# Patient Record
Sex: MALE | Race: WHITE | Employment: FULL TIME | URBAN - NONMETROPOLITAN AREA
[De-identification: names, ages, dates, MRNs, and addresses within clinical notes are randomized per-mention and may not be internally consistent; named-entity substitution may affect disease eponyms.]

---

## 2021-08-25 ENCOUNTER — APPOINTMENT (OUTPATIENT)
Dept: GENERAL RADIOLOGY | Facility: HOSPITAL | Age: 26
End: 2021-08-25
Attending: NURSE PRACTITIONER
Payer: OTHER MISCELLANEOUS

## 2021-08-25 ENCOUNTER — HOSPITAL ENCOUNTER (EMERGENCY)
Facility: HOSPITAL | Age: 26
Discharge: HOME | End: 2021-08-25
Attending: NURSE PRACTITIONER | Admitting: NURSE PRACTITIONER
Payer: OTHER MISCELLANEOUS

## 2021-08-25 VITALS
TEMPERATURE: 97.6 F | SYSTOLIC BLOOD PRESSURE: 156 MMHG | DIASTOLIC BLOOD PRESSURE: 90 MMHG | RESPIRATION RATE: 16 BRPM | HEART RATE: 54 BPM | OXYGEN SATURATION: 98 %

## 2021-08-25 DIAGNOSIS — T14.8XXA SPLINTER IN SKIN: Primary | ICD-10-CM

## 2021-08-25 PROCEDURE — 99213 OFFICE O/P EST LOW 20 MIN: CPT | Performed by: NURSE PRACTITIONER

## 2021-08-25 PROCEDURE — G0463 HOSPITAL OUTPT CLINIC VISIT: HCPCS

## 2021-08-25 PROCEDURE — 73140 X-RAY EXAM OF FINGER(S): CPT | Mod: RT

## 2021-08-25 ASSESSMENT — ENCOUNTER SYMPTOMS
CHILLS: 0
WOUND: 1
NAUSEA: 0
SHORTNESS OF BREATH: 0
VOMITING: 0
DIARRHEA: 0
FEVER: 0
PSYCHIATRIC NEGATIVE: 1

## 2021-08-25 NOTE — DISCHARGE INSTRUCTIONS
Rest, Ice and Elevate hand    Alternate tylenol and ibuprofen for discomfort as needed    Call and schedule a follow up appointment in 1 week with orthopedic associates at [phone] if still feeling splinter sensation, pain and decreased movement to finger.    Return to urgent care/ED as needed with any worsening in condition or additional concerns.

## 2021-08-25 NOTE — ED PROVIDER NOTES
History     Chief Complaint   Patient presents with     Foreign Body in Skin     HPI  Ronald Juarez is a 25 year old male who presents to urgent care today with complaints of a splinter to ring finger.  Patient states occurred shortly before he arrived as he was at work for [employer] and was putting up treated poles and wants to make sure he gets the splinter out.  Pain 3/10, tolerable.  Decreased ROM to fourth digit.  Denies any fever, chills, nausea, vomiting, diarrhea, SOB or chest pain.  UTD on Tdap last administered 11/27/2020.  No other concerns.     Allergies:  Allergies   Allergen Reactions     Contrast Dye Dizziness       Problem List:    There are no problems to display for this patient.       Past Medical History:    No past medical history on file.    Past Surgical History:    No past surgical history on file.    Family History:    No family history on file.    Social History:  Marital Status:   [2]  Social History     Tobacco Use     Smoking status: Not on file   Substance Use Topics     Alcohol use: Not on file     Drug use: Not on file        Medications:    No current outpatient medications on file.    Review of Systems   Constitutional: Negative for chills and fever.   Respiratory: Negative for shortness of breath.    Cardiovascular: Negative for chest pain.   Gastrointestinal: Negative for diarrhea, nausea and vomiting.   Musculoskeletal: Negative for gait problem.   Skin: Positive for wound (right hand ring finger).   Psychiatric/Behavioral: Negative.      Physical Exam   BP: 156/90  Pulse: 54  Temp: 97.6  F (36.4  C)  Resp: 16  SpO2: 98 %    Physical Exam  Vitals and nursing note reviewed.   Constitutional:       General: He is not in acute distress.     Appearance: He is not ill-appearing.   Cardiovascular:      Rate and Rhythm: Regular rhythm. Bradycardia present.      Pulses: Normal pulses.      Heart sounds: Normal heart sounds.   Pulmonary:      Effort: Pulmonary effort is normal.       Breath sounds: Normal breath sounds.   Musculoskeletal:      Right hand: Tenderness present. No swelling. Decreased range of motion (4th digit). Normal strength. Normal sensation. There is no disruption of two-point discrimination. Normal capillary refill. Normal pulse.      Comments: Small open wound to right ring finger from previous embedded splinter.  No obvious splinter noted in finger at this time.   Skin:     General: Skin is warm and dry.      Capillary Refill: Capillary refill takes less than 2 seconds.   Neurological:      Mental Status: He is alert.   Psychiatric:         Mood and Affect: Mood normal.       ED Course     Results for orders placed or performed during the hospital encounter of 08/25/21 (from the past 24 hour(s))   XR Finger Right G/E 2 Views    Narrative    PROCEDURE:  XR FINGER RT G/E 2 VW    HISTORY: possible foreign body    COMPARISON:  None.    TECHNIQUE:  2 views of the right fourth finger were obtained.    FINDINGS:  No fracture or dislocation is identified. The joint spaces  are preserved.        Impression    IMPRESSION: Normal right fourth finger      CALLI HICKS MD         SYSTEM ID:  [id number]       Medications - No data to display    Assessments & Plan (with Medical Decision Making)     I have reviewed the nursing notes.    I have reviewed the findings, diagnosis, plan and need for follow up with the patient.  (T14.8XXA) Splinter in skin  (primary encounter diagnosis)  Plan:   Small open wound to right ring finger from previous embedded splinter.  No obvious splinter noted in finger at this time.  No actively bleeding.  XR completed and impression shows normal right fourth finger.  Hand soaked in warm soapy water, flushed with 40cc NS.  Triple antibiotic and dressing placed.  Patient to rest finger, ice and elevate.  Ibuprofen and tylenol as needed for discomfort.  Patient to follow up with orthopedic associates in 1 week if symptoms not improving or worsening.  Patient  to return to urgent care/ED with any worsening in condition or additional concerns.  Patient in agreement with treatment plan.     New Prescriptions    No medications on file     Final diagnoses:   Splinter in skin     8/25/2021   [facility]     Urszula Gomez NP  08/25/21 1203

## 2021-08-25 NOTE — ED TRIAGE NOTES
Works for [employer] and was putting poles up and the wood is treated and got a piece stuck in his finger.  Pt attempted to get it out.  Last tdap 11/27/2020

## 2023-05-24 ENCOUNTER — HOSPITAL ENCOUNTER (EMERGENCY)
Facility: HOSPITAL | Age: 28
Discharge: HOME | End: 2023-05-24
Attending: NURSE PRACTITIONER | Admitting: NURSE PRACTITIONER
Payer: OTHER MISCELLANEOUS

## 2023-05-24 VITALS
WEIGHT: 221.6 LBS | RESPIRATION RATE: 18 BRPM | HEART RATE: 61 BPM | SYSTOLIC BLOOD PRESSURE: 114 MMHG | OXYGEN SATURATION: 99 % | TEMPERATURE: 97 F | DIASTOLIC BLOOD PRESSURE: 81 MMHG

## 2023-05-24 DIAGNOSIS — T23.201A PARTIAL THICKNESS BURN OF MULTIPLE SITES OF RIGHT HAND, INITIAL ENCOUNTER: ICD-10-CM

## 2023-05-24 PROBLEM — F41.9 ANXIETY: Status: ACTIVE | Noted: 2021-04-28

## 2023-05-24 PROCEDURE — 96374 THER/PROPH/DIAG INJ IV PUSH: CPT

## 2023-05-24 PROCEDURE — 16020 DRESS/DEBRID P-THICK BURN S: CPT | Performed by: NURSE PRACTITIONER

## 2023-05-24 PROCEDURE — 96375 TX/PRO/DX INJ NEW DRUG ADDON: CPT

## 2023-05-24 PROCEDURE — 16020 DRESS/DEBRID P-THICK BURN S: CPT

## 2023-05-24 PROCEDURE — 250N000009 HC RX 250: Performed by: NURSE PRACTITIONER

## 2023-05-24 PROCEDURE — 99284 EMERGENCY DEPT VISIT MOD MDM: CPT | Mod: 25

## 2023-05-24 PROCEDURE — 250N000011 HC RX IP 250 OP 636: Performed by: NURSE PRACTITIONER

## 2023-05-24 PROCEDURE — 96376 TX/PRO/DX INJ SAME DRUG ADON: CPT | Mod: XU

## 2023-05-24 PROCEDURE — 250N000013 HC RX MED GY IP 250 OP 250 PS 637: Performed by: NURSE PRACTITIONER

## 2023-05-24 RX ORDER — OXYCODONE HYDROCHLORIDE 5 MG/1
5 TABLET ORAL EVERY 6 HOURS PRN
Qty: 12 TABLET | Refills: 0 | Status: SHIPPED | OUTPATIENT
Start: 2023-05-24 | End: 2023-05-27

## 2023-05-24 RX ORDER — HYDROMORPHONE HYDROCHLORIDE 1 MG/ML
0.5 INJECTION, SOLUTION INTRAMUSCULAR; INTRAVENOUS; SUBCUTANEOUS
Status: DISCONTINUED | OUTPATIENT
Start: 2023-05-24 | End: 2023-05-24 | Stop reason: HOSPADM

## 2023-05-24 RX ORDER — LIDOCAINE HYDROCHLORIDE 20 MG/ML
JELLY TOPICAL ONCE
Status: COMPLETED | OUTPATIENT
Start: 2023-05-24 | End: 2023-05-24

## 2023-05-24 RX ORDER — KETOROLAC TROMETHAMINE 10 MG/1
10 TABLET, FILM COATED ORAL EVERY 6 HOURS PRN
Qty: 20 TABLET | Refills: 0 | Status: SHIPPED | OUTPATIENT
Start: 2023-05-24

## 2023-05-24 RX ORDER — KETOROLAC TROMETHAMINE 30 MG/ML
30 INJECTION, SOLUTION INTRAMUSCULAR; INTRAVENOUS ONCE
Status: COMPLETED | OUTPATIENT
Start: 2023-05-24 | End: 2023-05-24

## 2023-05-24 RX ORDER — OXYCODONE HYDROCHLORIDE 5 MG/1
5 TABLET ORAL ONCE
Status: COMPLETED | OUTPATIENT
Start: 2023-05-24 | End: 2023-05-24

## 2023-05-24 RX ADMIN — HYDROMORPHONE HYDROCHLORIDE 0.5 MG: 1 INJECTION, SOLUTION INTRAMUSCULAR; INTRAVENOUS; SUBCUTANEOUS at 13:18

## 2023-05-24 RX ADMIN — Medication 10 MG: at 15:25

## 2023-05-24 RX ADMIN — OXYCODONE HYDROCHLORIDE 5 MG: 5 TABLET ORAL at 15:26

## 2023-05-24 RX ADMIN — LIDOCAINE HYDROCHLORIDE: 20 JELLY TOPICAL at 13:18

## 2023-05-24 RX ADMIN — Medication 20 MG: at 14:54

## 2023-05-24 RX ADMIN — KETOROLAC TROMETHAMINE 30 MG: 30 INJECTION, SOLUTION INTRAMUSCULAR; INTRAVENOUS at 13:13

## 2023-05-24 RX ADMIN — Medication 10 MG: at 14:25

## 2023-05-24 ASSESSMENT — ENCOUNTER SYMPTOMS
ENDOCRINE NEGATIVE: 1
HEMATOLOGIC/LYMPHATIC NEGATIVE: 1
EYES NEGATIVE: 1
NEUROLOGICAL NEGATIVE: 1
PSYCHIATRIC NEGATIVE: 1
ALLERGIC/IMMUNOLOGIC NEGATIVE: 1
GASTROINTESTINAL NEGATIVE: 1
CARDIOVASCULAR NEGATIVE: 1
RESPIRATORY NEGATIVE: 1
MUSCULOSKELETAL NEGATIVE: 1
CONSTITUTIONAL NEGATIVE: 1

## 2023-05-24 ASSESSMENT — ACTIVITIES OF DAILY LIVING (ADL)
ADLS_ACUITY_SCORE: 35
ADLS_ACUITY_SCORE: 35
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
## 2023-05-24 NOTE — ED NOTES
Verbalizing understanding of discharge. Dressing supplies given ice bags renewed. Dressing intact. CMS intact
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
## 2023-05-24 NOTE — DISCHARGE INSTRUCTIONS
Wound care:   Wash your wound 2 times each day with soap and water.  After this, apply antibiotic ointment and a dressing (such as a Band-Aid) for 2-3 days.  Otherwise, keep your wound clean and dry.  This means no swimming or extended submersion in water until the wound is completely healed.  Remember, all wounds will leave some sort of scar.    Signs of infection:   Watch for signs of infection which include severe pain at site, increasing redness with pain, pus colored drainage, or if you develop a fever.  If this occurs, come back in for re-evaluation and most likely antibiotic therapy.       Narcotic pain medications (Oxycondone):   The medications prescribed can be quite effective for control of your pain but ARE NOT a safe long-term choice for your symptom control.  This medication is highly addictive and can lead to medication abuse.  It is recommended to try the ibuprofen/acetaminophen regimen (if able) described below before using the narcotic.   Do not drive, operate machinery, or do work that could harm people when under the influence of narcotic pain medications.  It can impair perception, reaction time, motor skills, and attention in ways that make it dangerous to drive, operate machinery, or engage in any activity at home or at work that could harm others or cause professional malpractice.  Just how long such impairments will last for a particular individual taking a particular type and dose is unknown, but it is at least several hours.  Drinking alcohol while taking narcotic pain medications makes impairment much worse, so abstain for alcohol use.    I recommend taking a stool softener such as Colace or Senokot-S which can be purchased over the counter while you are taking narcotics since these medications can be constipating.  You may also try Miralax OTC if needed for constipation.     Pain control:   If your past medical conditions, allergies, current medications, or current status does not  prevent you from using acetaminophen and/or ibuprofen, use the following: Take acetaminophen 650-1000 mg every 6 hours as needed for pain in addition to ibuprofen 400-600 mg every 6 hours as needed for pain.  Take these two medications together.       Ketorolac (Toradol):   While taking the ketorolac (Toradol), do not use aspirin, ibuprofen, Aleve, or naproxen products for pain control.  However, if able, you can use acetaminophen for further pain control in addition to the ketorolac.  Once done with the ketorolac, you may take aspirin, ibuprofen, Aleve, or naproxen products for pain control again.  Remember to keep well hydrated and take this medication with food.           Follow-up with your primary care provider for reevaluation.  Contact your primary care provider if you have any questions or concerns.  Do not hesitate to return to the ER if any new or worsening symptoms.     Please read the attached instructions (if any).  They highlight more specific treatments and interventions for you at home.              Thank you for letting me participate in your care and wish you a fast and uneventful recovery,    Mert STAFFORD CNP    Do not hesitate to contact me with questions or concerns.  [email or web address]  [email or web address]
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
## 2023-09-27 ENCOUNTER — HOSPITAL ENCOUNTER (EMERGENCY)
Facility: HOSPITAL | Age: 28
Discharge: HOME | End: 2023-09-27
Attending: STUDENT IN AN ORGANIZED HEALTH CARE EDUCATION/TRAINING PROGRAM | Admitting: STUDENT IN AN ORGANIZED HEALTH CARE EDUCATION/TRAINING PROGRAM
Payer: OTHER MISCELLANEOUS

## 2023-09-27 VITALS
SYSTOLIC BLOOD PRESSURE: 154 MMHG | OXYGEN SATURATION: 98 % | HEART RATE: 60 BPM | RESPIRATION RATE: 16 BRPM | DIASTOLIC BLOOD PRESSURE: 90 MMHG | TEMPERATURE: 98.3 F

## 2023-09-27 DIAGNOSIS — T75.4XXA: ICD-10-CM

## 2023-09-27 LAB
ALBUMIN UR-MCNC: 10 MG/DL
ANION GAP SERPL CALCULATED.3IONS-SCNC: 12 MMOL/L (ref 7–15)
APPEARANCE UR: CLEAR
BILIRUB UR QL STRIP: NEGATIVE
BUN SERPL-MCNC: 14.1 MG/DL (ref 6–20)
CALCIUM SERPL-MCNC: 9.8 MG/DL (ref 8.6–10)
CHLORIDE SERPL-SCNC: 103 MMOL/L (ref 98–107)
CK SERPL-CCNC: 180 U/L (ref 39–308)
COLOR UR AUTO: ABNORMAL
CREAT SERPL-MCNC: 0.86 MG/DL (ref 0.67–1.17)
DEPRECATED HCO3 PLAS-SCNC: 25 MMOL/L (ref 22–29)
EGFRCR SERPLBLD CKD-EPI 2021: >90 ML/MIN/1.73M2
GLUCOSE SERPL-MCNC: 91 MG/DL (ref 70–99)
GLUCOSE UR STRIP-MCNC: NEGATIVE MG/DL
HGB UR QL STRIP: ABNORMAL
KETONES UR STRIP-MCNC: NEGATIVE MG/DL
LACTATE SERPL-SCNC: 0.9 MMOL/L (ref 0.7–2)
LEUKOCYTE ESTERASE UR QL STRIP: NEGATIVE
NITRATE UR QL: NEGATIVE
PH UR STRIP: 6.5 [PH] (ref 4.7–8)
POTASSIUM SERPL-SCNC: 4.4 MMOL/L (ref 3.4–5.3)
RBC URINE: 3 /HPF
SODIUM SERPL-SCNC: 140 MMOL/L (ref 135–145)
SP GR UR STRIP: 1.01 (ref 1–1.03)
SQUAMOUS EPITHELIAL: 0 /HPF
TROPONIN T SERPL HS-MCNC: <6 NG/L
UROBILINOGEN UR STRIP-MCNC: NORMAL MG/DL
WBC URINE: <1 /HPF

## 2023-09-27 PROCEDURE — 82550 ASSAY OF CK (CPK): CPT | Performed by: STUDENT IN AN ORGANIZED HEALTH CARE EDUCATION/TRAINING PROGRAM

## 2023-09-27 PROCEDURE — 99284 EMERGENCY DEPT VISIT MOD MDM: CPT

## 2023-09-27 PROCEDURE — 93005 ELECTROCARDIOGRAM TRACING: CPT

## 2023-09-27 PROCEDURE — 84484 ASSAY OF TROPONIN QUANT: CPT | Performed by: STUDENT IN AN ORGANIZED HEALTH CARE EDUCATION/TRAINING PROGRAM

## 2023-09-27 PROCEDURE — 82374 ASSAY BLOOD CARBON DIOXIDE: CPT | Performed by: STUDENT IN AN ORGANIZED HEALTH CARE EDUCATION/TRAINING PROGRAM

## 2023-09-27 PROCEDURE — 83605 ASSAY OF LACTIC ACID: CPT | Performed by: STUDENT IN AN ORGANIZED HEALTH CARE EDUCATION/TRAINING PROGRAM

## 2023-09-27 PROCEDURE — 81003 URINALYSIS AUTO W/O SCOPE: CPT | Performed by: STUDENT IN AN ORGANIZED HEALTH CARE EDUCATION/TRAINING PROGRAM

## 2023-09-27 PROCEDURE — 99284 EMERGENCY DEPT VISIT MOD MDM: CPT | Performed by: STUDENT IN AN ORGANIZED HEALTH CARE EDUCATION/TRAINING PROGRAM

## 2023-09-27 PROCEDURE — 36415 COLL VENOUS BLD VENIPUNCTURE: CPT | Performed by: STUDENT IN AN ORGANIZED HEALTH CARE EDUCATION/TRAINING PROGRAM

## 2023-09-27 ASSESSMENT — ACTIVITIES OF DAILY LIVING (ADL): ADLS_ACUITY_SCORE: 35

## 2023-09-27 NOTE — DISCHARGE INSTRUCTIONS
- Please return to the Emergency Room if you do not improve, feel worse, or have any new or concerning symptoms. We would especially want to see you back if you experience chest pain, palpitations, shortness of breath, or blood in your urine.  - Please follow up with a primary care physician in 2-3 days if you have any further symptoms or concerns

## 2023-09-27 NOTE — ED PROVIDER NOTES
"  History     Chief Complaint   Patient presents with    Electric Shock     HPI  Ronald Juarez is a 27 year old male, otherwise healthy, presenting after electrocution. He was leaning against his work truck when the truck accidentally became connected to a circuit and he was shocked on his right arm. He was wearing his boots at the time. No LOC. Took off boots and inspected his own body with no burn. He now feels back to baseline. No arm pain. No headache. No CP. No SOB. Has not peed yet. No abdominal pain. No other symptoms.    Allergies:  Allergies   Allergen Reactions    Contrast Dye Dizziness    Iodinated Contrast Media Other (See Comments)     \"Throat closing in\"  Either (Gadopatetate dimeglumine)  or [address] (Iothalamate meglumine       Problem List:    Patient Active Problem List    Diagnosis Date Noted    Anxiety 04/28/2021     Priority: Medium    Excessive drinking of alcohol 03/04/2015     Priority: Medium    CAITIE on CPAP 03/04/2015     Priority: Medium        Past Medical History:    History reviewed. No pertinent past medical history.    Past Surgical History:    History reviewed. No pertinent surgical history.    Family History:    History reviewed. No pertinent family history.    Social History:  Marital Status:   [2]  Social History     Tobacco Use    Smoking status: Never    Smokeless tobacco: Current     Types: Chew   Substance Use Topics    Alcohol use: Yes     Comment: occasional    Drug use: Not Currently        Medications:    ketorolac (TORADOL) 10 MG tablet          Review of Systems   All other systems reviewed and are negative.      Physical Exam   BP: 168/92  Pulse: 76  Temp: (!) 96.7  F (35.9  C)  Resp: 16  SpO2: 97 %      Physical Exam  Vitals and nursing note reviewed.   Constitutional:       General: He is not in acute distress.     Appearance: Normal appearance. He is not ill-appearing or toxic-appearing.   HENT:      Head: Normocephalic.      Right Ear: External ear normal. "      Left Ear: External ear normal.      Nose: Nose normal.      Mouth/Throat:      Mouth: Mucous membranes are moist.      Pharynx: Oropharynx is clear.   Eyes:      Extraocular Movements: Extraocular movements intact.      Conjunctiva/sclera: Conjunctivae normal.      Pupils: Pupils are equal, round, and reactive to light.   Cardiovascular:      Rate and Rhythm: Normal rate and regular rhythm.      Pulses: Normal pulses.      Heart sounds: Normal heart sounds.   Pulmonary:      Effort: Pulmonary effort is normal.      Breath sounds: Normal breath sounds.   Abdominal:      General: Abdomen is flat.      Palpations: Abdomen is soft.   Musculoskeletal:         General: No swelling or tenderness. Normal range of motion.      Cervical back: Normal range of motion.   Skin:     General: Skin is warm and dry.      Capillary Refill: Capillary refill takes less than 2 seconds.      Findings: No lesion or rash.      Comments: No visible burn or skin changes.   Neurological:      General: No focal deficit present.      Mental Status: He is alert and oriented to person, place, and time.   Psychiatric:         Mood and Affect: Mood normal.         ED Course        ED Course as of 09/27/23 1323   Wed Sep 27, 2023   1319 UA with Microscopic reflex to Culture(!)  Minimal to no blood. No other obvious concerns. Okay for follow-up with PCP.   1319 Findings were discussed with the patient including non-emergent imaging/lab results. Additional verbal instructions were discussed with the patient as well. Instructed to follow up with a primary care provide within 4-5 days. Also discussed specific warning signs and instructed to return to the ED if there are any concerns. Patient voiced understanding of instructions, questions were answered and the patient was discharged home in stable condition.     Procedures              EKG Interpretation:      Interpreted by GRZEGORZ HSU MD  Time reviewed: 11:45  Symptoms at time of EKG: None    Rhythm: normal sinus   Rate: normal  Axis: normal  Ectopy: none  Conduction: normal  ST Segments/ T Waves: No ST-T wave changes  Q Waves: none  Comparison to prior: No old EKG available    Clinical Impression: normal EKG       Results for orders placed or performed during the hospital encounter of 09/27/23 (from the past 24 hour(s))   EKG 12-lead, tracing only   Result Value Ref Range    Systolic Blood Pressure  mmHg    Diastolic Blood Pressure  mmHg    Ventricular Rate 73 BPM    Atrial Rate 73 BPM    AK Interval 134 ms    QRS Duration 98 ms     ms    QTc 405 ms    P Axis 45 degrees    R AXIS -6 degrees    T Axis 22 degrees    Interpretation ECG       Sinus rhythm  Normal ECG  No previous ECGs available     Basic metabolic panel   Result Value Ref Range    Sodium 140 135 - 145 mmol/L    Potassium 4.4 3.4 - 5.3 mmol/L    Chloride 103 98 - 107 mmol/L    Carbon Dioxide (CO2) 25 22 - 29 mmol/L    Anion Gap 12 7 - 15 mmol/L    Urea Nitrogen 14.1 6.0 - 20.0 mg/dL    Creatinine 0.86 0.67 - 1.17 mg/dL    GFR Estimate >90 >60 mL/min/1.73m2    Calcium 9.8 8.6 - 10.0 mg/dL    Glucose 91 70 - 99 mg/dL   Troponin T, High Sensitivity   Result Value Ref Range    Troponin T, High Sensitivity <6 <=22 ng/L   CK total   Result Value Ref Range     39 - 308 U/L   Lactic acid whole blood   Result Value Ref Range    Lactic Acid 0.9 0.7 - 2.0 mmol/L   Extra Tube    Narrative    The following orders were created for panel order Extra Tube.  Procedure                               Abnormality         Status                     ---------                               -----------         ------                     Extra Blue Top Tube[[id number]]                                                         Extra Red Top Tube[[id number]]                                                          Extra Purple Top Tube[[id number]]                                                         Please view results for these tests on the individual  orders.   UA with Microscopic reflex to Culture    Specimen: Urine, Midstream   Result Value Ref Range    Color Urine Straw Colorless, Straw, Light Yellow, Yellow    Appearance Urine Clear Clear    Glucose Urine Negative Negative mg/dL    Bilirubin Urine Negative Negative    Ketones Urine Negative Negative mg/dL    Specific Gravity Urine 1.006 1.003 - 1.035    Blood Urine Small (A) Negative    pH Urine 6.5 4.7 - 8.0    Protein Albumin Urine 10 (A) Negative mg/dL    Urobilinogen Urine Normal Normal, 2.0 mg/dL    Nitrite Urine Negative Negative    Leukocyte Esterase Urine Negative Negative    RBC Urine 3 (H) <=2 /HPF    WBC Urine <1 <=5 /HPF    Squamous Epithelials Urine 0 <=1 /HPF    Narrative    Urine Culture not indicated       Medications - No data to display    Assessments & Plan (with Medical Decision Making)     I have reviewed the nursing notes.    27yoM with an accidental electrocution. Currently asymptomatic but high voltage, suspect 2400V. EKG without arryhthmia. No LOC. Will plan on labs, UA. If negative, okay for discharge and PCP follow-up and return precautions.    See ED Course.    I have reviewed the findings, diagnosis, plan and need for follow up with the patient.    New Prescriptions    No medications on file       Final diagnoses:   Accidental electrocution, initial encounter       9/27/2023   [facility]       Fernando Babin MD  09/27/23 5140

## 2023-09-27 NOTE — ED TRIAGE NOTES
"Patient presents with c/o being at work ([employer]) Patient got shocked in right arm. \"Took boots off no exit rachel\" Patient right forearm appears red. Denies any chest pain or SOB at this time         "

## 2023-09-27 NOTE — ED NOTES
Discharge instructions reviewed with patient. Encouraged to return with new or worsening symptoms. No questions or concerns. Denies pain. Copy of AVS in hand on discharge  Ambulated out of ED indep and gait steady.

## 2023-09-29 LAB
ATRIAL RATE - MUSE: 73 BPM
DIASTOLIC BLOOD PRESSURE - MUSE: NORMAL MMHG
INTERPRETATION ECG - MUSE: NORMAL
P AXIS - MUSE: 45 DEGREES
PR INTERVAL - MUSE: 134 MS
QRS DURATION - MUSE: 98 MS
QT - MUSE: 368 MS
QTC - MUSE: 405 MS
R AXIS - MUSE: -6 DEGREES
SYSTOLIC BLOOD PRESSURE - MUSE: NORMAL MMHG
T AXIS - MUSE: 22 DEGREES
VENTRICULAR RATE- MUSE: 73 BPM